# Patient Record
Sex: FEMALE | Race: WHITE | Employment: FULL TIME | URBAN - METROPOLITAN AREA
[De-identification: names, ages, dates, MRNs, and addresses within clinical notes are randomized per-mention and may not be internally consistent; named-entity substitution may affect disease eponyms.]

---

## 2017-07-20 ENCOUNTER — HOSPITAL ENCOUNTER (OUTPATIENT)
Dept: MAMMOGRAPHY | Age: 47
Discharge: HOME | End: 2017-07-20
Attending: FAMILY MEDICINE
Payer: COMMERCIAL

## 2017-07-20 DIAGNOSIS — Z12.31 VISIT FOR SCREENING MAMMOGRAM: ICD-10-CM

## 2017-07-20 PROCEDURE — 77067 SCR MAMMO BI INCL CAD: CPT

## 2018-05-09 ENCOUNTER — HOSPITAL ENCOUNTER (OUTPATIENT)
Dept: MRI IMAGING | Age: 48
Discharge: HOME | End: 2018-05-09
Attending: PHYSICAL MEDICINE & REHABILITATION
Payer: COMMERCIAL

## 2018-05-09 DIAGNOSIS — M47.812 SPONDYLOSIS WITHOUT MYELOPATHY OR RADICULOPATHY, CERVICAL REGION: ICD-10-CM

## 2018-05-09 DIAGNOSIS — M47.812 CERVICAL SPONDYLOSIS WITHOUT MYELOPATHY: ICD-10-CM

## 2018-05-09 PROCEDURE — 72141 MRI NECK SPINE W/O DYE: CPT

## 2018-05-17 ENCOUNTER — HOSPITAL ENCOUNTER (OUTPATIENT)
Dept: MRI IMAGING | Age: 48
Discharge: HOME | End: 2018-05-17
Attending: PHYSICAL MEDICINE & REHABILITATION
Payer: COMMERCIAL

## 2018-05-17 DIAGNOSIS — R93.7 MUSCULOSKELETAL SYSTEM IMAGING ABNORMALITY: ICD-10-CM

## 2018-05-17 PROCEDURE — 74011250636 HC RX REV CODE- 250/636: Performed by: RADIOLOGY

## 2018-05-17 PROCEDURE — A9575 INJ GADOTERATE MEGLUMI 0.1ML: HCPCS | Performed by: RADIOLOGY

## 2018-05-17 PROCEDURE — 70553 MRI BRAIN STEM W/O & W/DYE: CPT

## 2018-05-17 RX ORDER — GADOTERATE MEGLUMINE 376.9 MG/ML
14 INJECTION INTRAVENOUS
Status: COMPLETED | OUTPATIENT
Start: 2018-05-17 | End: 2018-05-17

## 2018-05-17 RX ADMIN — GADOTERATE MEGLUMINE 14 ML: 376.9 INJECTION INTRAVENOUS at 21:33

## 2018-07-16 ENCOUNTER — OFFICE VISIT (OUTPATIENT)
Dept: FAMILY MEDICINE CLINIC | Age: 48
End: 2018-07-16

## 2018-07-16 VITALS
WEIGHT: 156 LBS | OXYGEN SATURATION: 99 % | DIASTOLIC BLOOD PRESSURE: 78 MMHG | BODY MASS INDEX: 29.45 KG/M2 | RESPIRATION RATE: 18 BRPM | HEIGHT: 61 IN | SYSTOLIC BLOOD PRESSURE: 118 MMHG | HEART RATE: 72 BPM | TEMPERATURE: 98.6 F

## 2018-07-16 DIAGNOSIS — C34.91 ADENOCARCINOMA OF RIGHT LUNG (HCC): ICD-10-CM

## 2018-07-16 DIAGNOSIS — Z00.00 ROUTINE GENERAL MEDICAL EXAMINATION AT A HEALTH CARE FACILITY: Primary | ICD-10-CM

## 2018-07-16 RX ORDER — DOCUSATE SODIUM 100 MG/1
100 CAPSULE, LIQUID FILLED ORAL 2 TIMES DAILY
COMMUNITY
End: 2019-08-15

## 2018-07-16 RX ORDER — LISINOPRIL 5 MG/1
TABLET ORAL DAILY
COMMUNITY
End: 2018-07-19 | Stop reason: SDUPTHER

## 2018-07-16 RX ORDER — OXYCODONE AND ACETAMINOPHEN 5; 325 MG/1; MG/1
TABLET ORAL
COMMUNITY
End: 2019-08-15

## 2018-07-16 RX ORDER — LOVASTATIN 20 MG/1
20 TABLET ORAL
COMMUNITY
End: 2019-08-15

## 2018-07-16 RX ORDER — MELATONIN 5 MG
5 CAPSULE ORAL
COMMUNITY
End: 2019-08-15

## 2018-07-16 NOTE — MR AVS SNAPSHOT
[address] [id number] [phone] Patient: Kodi David MRN: [id number] WCS:9/96/8708 Visit Information Date & Time Provider Department Dept. Phone Encounter #  
 7/16/2018 10:00 AM Ana Hector MD 8637 [facility] [phone] [id number] Upcoming Health Maintenance Date Due DTaP/Tdap/Td series (1 - Tdap) 2/14/1989 PAP AKA CERVICAL CYTOLOGY 2/14/1989 FOBT Q 1 YEAR AGE 50-75 2/14/2018 Influenza Age 5 to Adult 8/1/2018 BREAST CANCER SCRN MAMMOGRAM 7/20/2019 Allergies as of 7/16/2018  Review Complete On: 7/16/2018 By: Ana Hector MD  
  
 Severity Noted Reaction Type Reactions Codeine  05/17/2018    Unknown (comments) Sulfur  05/17/2018    Unknown (comments) Current Immunizations  Never Reviewed No immunizations on file. Not reviewed this visit You Were Diagnosed With   
  
 Codes Comments Routine general medical examination at a health care facility    -  Primary ICD-10-CM: Z00.00 ICD-9-CM: V70.0 Ad[facility]     ICD-10-CM: C34.91 
ICD-9-CM: 162.9 Vitals BP Pulse Temp Resp Height(growth percentile) Weight(growth percentile) 118/78 (BP 1 Location: Right arm, BP Patient Position: Sitting) 72 98.6 °F (37 °C) (Oral) 18 5' 1\" (1.549 m) 156 lb (70.8 kg) LMP SpO2 BMI OB Status Smoking Status 06/24/2018 (Exact Date) 99% 29.48 kg/m2 Having regular periods Former Smoker Vitals History BMI and BSA Data Body Mass Index Body Surface Area  
 29.48 kg/m 2 1.75 m 2 Preferred Pharmacy Pharmacy Name Phone [facility] [address] [place] [facility] [phone] Your Updated Medication List  
  
   
This list is accurate as of 7/16/18 11:25 AM.  Always use your most recent med list.  
  
  
  
  
 COLACE 100 mg capsule Generic drug:  docusate sodium Take 100 mg by mouth two (2) times a day. lisinopril 5 mg tablet Commonly known as:  Loni Pinch Take  by mouth daily. lovastatin 20 mg tablet Commonly known as:  MEVACOR Take 20 mg by mouth nightly. melatonin 5 mg Cap capsule Take 5 mg by mouth nightly. oxyCODONE-acetaminophen 5-325 mg per tablet Commonly known as:  PERCOCET Take  by mouth every four (4) hours as needed for Pain. We Performed the Following CBC WITH AUTOMATED DIFF [12300 CPT(R)] HEMOGLOBIN A1C WITH EAG [11550 CPT(R)] LIPID PANEL [78117 CPT(R)] METABOLIC PANEL, COMPREHENSIVE [01685 CPT(R)] TSH 3RD GENERATION [37390 CPT(R)] Introducing [facility]! Dear Noemi Vance: Thank you for requesting a [employer] account. Our records indicate that you already have an active [employer] account. You can access your account anytime at [email or web address] Did you know that you can access your hospital and ER discharge instructions at any time in [employer]? You can also review all of your test results from your hospital stay or ER visit. Additional Information If you have questions, please visit the Frequently Asked Questions section of the [employer] website at [email or web address] Remember, [employer] is NOT to be used for urgent needs. For medical emergencies, dial 911. Now available from your iPhone and Android! Please provide this summary of care documentation to your next provider. Your primary care clinician is listed as Teddy Meier. If you have any questions after today's visit, please call [phone].

## 2018-07-16 NOTE — PROGRESS NOTES
Pt here with mother to establish care. Requesting to have routine fasting lab work. Reports she was recently diagnosed with lung cancer and starts radiation tomorrow. Pt woke up with neck pain, did not get better, went to chiropractor, pain persisted, was referred by PCP to [facility], was evaluated and under their treatment for several months with no improvement. Pt had an MRI of cervical spine, was then referred to Dr. Juan Carlos Dickey, neurosurgeon. Pt went to ER due to pain, had multiple images, was diagnosed with lung cancer (adenocarcinoma) at that time. Pt is about to start on oral therapy, once monthly infusion and radiation. Pt is being treated by Dr. Josiah Green, heme/onc and Dr. Sandie Mon- radiation. Pt is in a chest and neck brace due to multiple fractures. Pt had a recent PET scan. Subjective: (As above and below)     Chief Complaint   Patient presents with   [address]     she is a 46y.o. year old female who presents for evaluation. Reviewed PmHx, RxHx, FmHx, SocHx, AllgHx and updated in chart. Review of Systems - negative except as listed above    Objective:     Vitals:    07/16/18 1031   BP: 118/78   Pulse: 72   Resp: 18   Temp: 98.6 °F (37 °C)   TempSrc: Oral   SpO2: 99%   Weight: 156 lb (70.8 kg)   Height: 5' 1\" (1.549 m)     Physical Examination: General appearance - alert, well appearing, and in no distress  Mental status - normal mood, behavior, speech, dress, motor activity, and thought processes  Mouth - mucous membranes moist, pharynx normal without lesions  Chest - clear to auscultation, no wheezes, rales or rhonchi, symmetric air entry  Heart - normal rate, regular rhythm, normal S1, S2, no murmurs, rubs, clicks or gallops  Musculoskeletal - full neck/chest brace    Assessment/ Plan:   1.  Routine general medical examination at a health care facility  -check fasting labs  - METABOLIC PANEL, COMPREHENSIVE  - CBC WITH AUTOMATED DIFF  - LIPID PANEL  - HEMOGLOBIN A1C WITH EAG  - TSH 3RD GENERATION    2. Adenocarcinoma of right lung [facility])  -request records from all specialists      Follow-up Disposition: As needed  I have discussed the diagnosis with the patient and the intended plan as seen in the above orders. The patient has received an after-visit summary and questions were answered concerning future plans.      Medication Side Effects and Warnings were discussed with patient: yes  Patient Labs were reviewed: yes  Patient Past Records were reviewed:  yes    Jesus Herman M.D.

## 2018-07-17 ENCOUNTER — PATIENT OUTREACH (OUTPATIENT)
Dept: FAMILY MEDICINE CLINIC | Age: 48
End: 2018-07-17

## 2018-07-17 LAB
ALBUMIN SERPL-MCNC: 4.4 G/DL (ref 3.5–5.5)
ALBUMIN/GLOB SERPL: 1.4 {RATIO} (ref 1.2–2.2)
ALP SERPL-CCNC: 164 IU/L (ref 39–117)
ALT SERPL-CCNC: 13 IU/L (ref 0–32)
AST SERPL-CCNC: 16 IU/L (ref 0–40)
BASOPHILS # BLD AUTO: 0 X10E3/UL (ref 0–0.2)
BASOPHILS NFR BLD AUTO: 1 %
BILIRUB SERPL-MCNC: 0.3 MG/DL (ref 0–1.2)
BUN SERPL-MCNC: 11 MG/DL (ref 6–24)
BUN/CREAT SERPL: 14 (ref 9–23)
CALCIUM SERPL-MCNC: 10 MG/DL (ref 8.7–10.2)
CHLORIDE SERPL-SCNC: 102 MMOL/L (ref 96–106)
CHOLEST SERPL-MCNC: 170 MG/DL (ref 100–199)
CO2 SERPL-SCNC: 24 MMOL/L (ref 20–29)
CREAT SERPL-MCNC: 0.76 MG/DL (ref 0.57–1)
EOSINOPHIL # BLD AUTO: 0.1 X10E3/UL (ref 0–0.4)
EOSINOPHIL NFR BLD AUTO: 2 %
ERYTHROCYTE [DISTWIDTH] IN BLOOD BY AUTOMATED COUNT: 13.9 % (ref 12.3–15.4)
EST. AVERAGE GLUCOSE BLD GHB EST-MCNC: 111 MG/DL
GLOBULIN SER CALC-MCNC: 3.1 G/DL (ref 1.5–4.5)
GLUCOSE SERPL-MCNC: 88 MG/DL (ref 65–99)
HBA1C MFR BLD: 5.5 % (ref 4.8–5.6)
HCT VFR BLD AUTO: 37.2 % (ref 34–46.6)
HDLC SERPL-MCNC: 58 MG/DL
HGB BLD-MCNC: 11.8 G/DL (ref 11.1–15.9)
IMM GRANULOCYTES # BLD: 0 X10E3/UL (ref 0–0.1)
IMM GRANULOCYTES NFR BLD: 0 %
INTERPRETATION, 910389: NORMAL
LDLC SERPL CALC-MCNC: 98 MG/DL (ref 0–99)
LYMPHOCYTES # BLD AUTO: 1.5 X10E3/UL (ref 0.7–3.1)
LYMPHOCYTES NFR BLD AUTO: 27 %
MCH RBC QN AUTO: 28.1 PG (ref 26.6–33)
MCHC RBC AUTO-ENTMCNC: 31.7 G/DL (ref 31.5–35.7)
MCV RBC AUTO: 89 FL (ref 79–97)
MONOCYTES # BLD AUTO: 0.5 X10E3/UL (ref 0.1–0.9)
MONOCYTES NFR BLD AUTO: 9 %
NEUTROPHILS # BLD AUTO: 3.4 X10E3/UL (ref 1.4–7)
NEUTROPHILS NFR BLD AUTO: 61 %
PLATELET # BLD AUTO: 267 X10E3/UL (ref 150–379)
POTASSIUM SERPL-SCNC: 4.7 MMOL/L (ref 3.5–5.2)
PROT SERPL-MCNC: 7.5 G/DL (ref 6–8.5)
RBC # BLD AUTO: 4.2 X10E6/UL (ref 3.77–5.28)
SODIUM SERPL-SCNC: 141 MMOL/L (ref 134–144)
TRIGL SERPL-MCNC: 72 MG/DL (ref 0–149)
TSH SERPL DL<=0.005 MIU/L-ACNC: 1.19 UIU/ML (ref 0.45–4.5)
VLDLC SERPL CALC-MCNC: 14 MG/DL (ref 5–40)
WBC # BLD AUTO: 5.6 X10E3/UL (ref 3.4–10.8)

## 2018-07-17 NOTE — PROGRESS NOTES
Nurse Navigator Documentation      Date/Time:  7/17/2018 10:42 AM    [facility] records printed and delivered to Dr. Nweton Chirinos as requested

## 2018-07-17 NOTE — PROGRESS NOTES
Alkaline phosphatase elevated, all other labs are within normal limits. A message has been sent in [employer] and the lab work released to the patient.

## 2018-10-02 ENCOUNTER — OFFICE VISIT (OUTPATIENT)
Dept: FAMILY MEDICINE CLINIC | Age: 48
End: 2018-10-02

## 2018-10-02 VITALS
BODY MASS INDEX: 26.43 KG/M2 | SYSTOLIC BLOOD PRESSURE: 104 MMHG | OXYGEN SATURATION: 99 % | RESPIRATION RATE: 16 BRPM | DIASTOLIC BLOOD PRESSURE: 72 MMHG | HEIGHT: 61 IN | HEART RATE: 107 BPM | WEIGHT: 140 LBS | TEMPERATURE: 98.1 F

## 2018-10-02 DIAGNOSIS — B37.2 YEAST DERMATITIS: Primary | ICD-10-CM

## 2018-10-02 RX ORDER — FLUCONAZOLE 150 MG/1
150 TABLET ORAL DAILY
Qty: 2 TAB | Refills: 0 | Status: SHIPPED | OUTPATIENT
Start: 2018-10-02 | End: 2019-08-15

## 2018-10-02 RX ORDER — NYSTATIN 100000 U/G
CREAM TOPICAL 2 TIMES DAILY
Qty: 15 G | Refills: 0 | Status: SHIPPED | OUTPATIENT
Start: 2018-10-02 | End: 2019-08-15

## 2018-10-02 NOTE — MR AVS SNAPSHOT
[address] [address] 
[phone] Patient: Leyla Jones MRN: [id number] VGD:9/81/4727 Visit Information Date & Time Provider Department Dept. Phone Encounter #  
 10/2/2018  8:20 AM Gonzalez Luis MD [address] [facility] [phone] [id number] Upcoming Health Maintenance Date Due Pneumococcal 19-64 Highest Risk (1 of 3 - PCV13) 2/14/1987 DTaP/Tdap/Td series (1 - Tdap) 2/14/1989 PAP AKA CERVICAL CYTOLOGY 2/14/1989 Shingrix Vaccine Age 50> (1 of 2) 2/14/2018 FOBT Q 1 YEAR AGE 50-75 2/14/2018 Influenza Age 5 to Adult 1/2/2019* BREAST CANCER SCRN MAMMOGRAM 7/20/2019 *Topic was postponed. The date shown is not the original due date. Allergies as of 10/2/2018  Review Complete On: 10/2/2018 By: Gonzalez Luis MD  
  
 Severity Noted Reaction Type Reactions Codeine  05/17/2018    Unknown (comments) Sulfur  05/17/2018    Unknown (comments) Current Immunizations  Never Reviewed No immunizations on file. Not reviewed this visit You Were Diagnosed With   
  
 Codes Comments Yeast dermatitis    -  Primary ICD-10-CM: B37.2 ICD-9-CM: 112.3 Vitals BP Pulse Temp Resp Height(growth percentile) Weight(growth percentile) 104/72 (!) 107 98.1 °F (36.7 °C) (Oral) 16 5' 1\" (1.549 m) 140 lb (63.5 kg) LMP SpO2 BMI OB Status Smoking Status 07/31/2018 99% 26.45 kg/m2 Premenopausal Former Smoker Vitals History BMI and BSA Data Body Mass Index Body Surface Area  
 26.45 kg/m 2 1.65 m 2 Preferred Pharmacy Pharmacy Name Phone [facility] [address] [place] AT [facility] [phone] Your Updated Medication List  
  
   
This list is accurate as of 10/2/18  8:43 AM.  Always use your most recent med list.  
  
  
  
  
 COLACE 100 mg capsule Generic drug:  docusate sodium Take 100 mg by mouth two (2) times a day. fluconazole 150 mg tablet Commonly known as:  DIFLUCAN Take 1 Tab by mouth daily. Repeat in 3 days if needed. lisinopril 5 mg tablet Commonly known as:  Nancylee Foyer Take 1 Tab by mouth daily. lovastatin 20 mg tablet Commonly known as:  MEVACOR Take 20 mg by mouth nightly. melatonin 5 mg Cap capsule Take 5 mg by mouth nightly. nystatin topical cream  
Commonly known as:  MYCOSTATIN Apply  to affected area two (2) times a day. oxyCODONE-acetaminophen 5-325 mg per tablet Commonly known as:  PERCOCET Take  by mouth every four (4) hours as needed for Pain. Prescriptions Sent to Pharmacy Refills  
 fluconazole (DIFLUCAN) 150 mg tablet 0 Sig: Take 1 Tab by mouth daily. Repeat in 3 days if needed. Class: Normal  
 Pharmacy: [address] Ph #: [phone] Route: Oral  
 nystatin (MYCOSTATIN) topical cream 0 Sig: Apply  to affected area two (2) times a day. Class: Normal  
 Pharmacy: [address] Ph #: [phone] Route: Topical  
  
Introducing [facility]! Dear Ashlie Gunter: Thank you for requesting a [employer] account. Our records indicate that you already have an active [employer] account. You can access your account anytime at [email or web address] Did you know that you can access your hospital and ER discharge instructions at any time in [employer]? You can also review all of your test results from your hospital stay or ER visit. Additional Information If you have questions, please visit the Frequently Asked Questions section of the [employer] website at [email or web address] Remember, [employer] is NOT to be used for urgent needs.  For medical emergencies, dial 911. Now available from your iPhone and Android! Please provide this summary of care documentation to your next provider. Your primary care clinician is listed as Teddy Meier. If you have any questions after today's visit, please call [phone].

## 2018-10-02 NOTE — PROGRESS NOTES
1. Have you been to the ER, urgent care clinic since your last visit? Hospitalized since your last visit? Yes, Ltuher Mendoza, 8/12/18    2. Have you seen or consulted any other health care providers outside of the [address] [place] [address] since your last visit? Include any pap smears or colon screening. No     Chief Complaint   Patient presents with    Rash     Panty Line, x1 day     Pt reports that rash is not painful, noted moisture on underwear. Pt is in a c-collar. Subjective: (As above and below)     Chief Complaint   Patient presents with    Rash     Panty Line, x1 day     she is a 46y.o. year old female who presents for evaluation. Reviewed PmHx, RxHx, FmHx, SocHx, AllgHx and updated in chart. Review of Systems - negative except as listed above    Objective:     Vitals:    10/02/18 0817   BP: 104/72   Pulse: (!) 107   Resp: 16   Temp: 98.1 °F (36.7 °C)   TempSrc: Oral   SpO2: 99%   Weight: 140 lb (63.5 kg)   Height: 5' 1\" (1.549 m)     Physical Examination: General appearance - alert, well appearing, and in no distress  Mental status - normal mood, behavior, speech, dress, motor activity, and thought processes  Ears - bilateral TM's and external ear canals normal  Mouth - mucous membranes moist, pharynx normal without lesions  Chest - clear to auscultation, no wheezes, rales or rhonchi, symmetric air entry  Heart - normal rate, regular rhythm, normal S1, S2, no murmurs, rubs, clicks or gallops  Musculoskeletal - c-collar in palce  Skin - erythematous weeping rash under lower abdominal skin fold, +yeast odor    Assessment/ Plan:   1. Yeast dermatitis  -use medications as written  - fluconazole (DIFLUCAN) 150 mg tablet; Take 1 Tab by mouth daily. Repeat in 3 days if needed. Dispense: 2 Tab; Refill: 0  - nystatin (MYCOSTATIN) topical cream; Apply  to affected area two (2) times a day.   Dispense: 15 g; Refill: 0     Follow-up Disposition: As needed  I have discussed the diagnosis with the patient and the intended plan as seen in the above orders. The patient has received an after-visit summary and questions were answered concerning future plans.      Medication Side Effects and Warnings were discussed with patient: yes  Patient Labs were reviewed: yes  Patient Past Records were reviewed:  yes    Quinten Montiel M.D.

## 2019-08-15 ENCOUNTER — OFFICE VISIT (OUTPATIENT)
Dept: FAMILY MEDICINE CLINIC | Age: 49
End: 2019-08-15

## 2019-08-15 VITALS
DIASTOLIC BLOOD PRESSURE: 59 MMHG | HEART RATE: 73 BPM | WEIGHT: 126 LBS | RESPIRATION RATE: 18 BRPM | BODY MASS INDEX: 23.79 KG/M2 | OXYGEN SATURATION: 100 % | SYSTOLIC BLOOD PRESSURE: 92 MMHG | HEIGHT: 61 IN | TEMPERATURE: 98.6 F

## 2019-08-15 DIAGNOSIS — Z12.11 SCREENING FOR MALIGNANT NEOPLASM OF COLON: ICD-10-CM

## 2019-08-15 DIAGNOSIS — Z12.4 SCREENING FOR MALIGNANT NEOPLASM OF CERVIX: ICD-10-CM

## 2019-08-15 DIAGNOSIS — E55.9 HYPOVITAMINOSIS D: ICD-10-CM

## 2019-08-15 DIAGNOSIS — Z13.29 SCREENING FOR THYROID DISORDER: ICD-10-CM

## 2019-08-15 DIAGNOSIS — Z00.00 ENCOUNTER FOR ANNUAL PHYSICAL EXAM: Primary | ICD-10-CM

## 2019-08-15 DIAGNOSIS — C34.91 ADENOCARCINOMA OF RIGHT LUNG (HCC): ICD-10-CM

## 2019-08-15 DIAGNOSIS — N95.9 POSTMENOPAUSAL SYMPTOMS: ICD-10-CM

## 2019-08-15 NOTE — PATIENT INSTRUCTIONS
1. Yams  2. Flax seeds / flax seed oil  3. Pomegranate seeds  4.  Over the counter Borage oil 1000 mg capsule twice a day

## 2019-08-15 NOTE — PROGRESS NOTES
Chief Complaint   Patient presents with    Physical    Labs     Patient in office today for cpe and fasting labs. Pt currently receives gyn care @ [address] [address] with . Pt would like to discuss provider managing gyn care. 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 LMP was in 7/2018, hot flashes and night sweats have been noted. 1. Have you been to the ER, urgent care clinic since your last visit? Hospitalized since your last visit? No    2. Have you seen or consulted any other health care providers outside of the [address] [place] [address] since your last visit? Include any pap smears or colon screening.  No

## 2019-08-15 NOTE — PROGRESS NOTES
Chief Complaint   Patient presents with    Physical    Labs     Patient in office today for cpe and fasting labs. Pt currently receives gyn care @ [facility] with Dr. Yair Patrick. Pt would like to discuss provider managing gyn care. 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 LMP was in 7/2018, hot flashes and night sweats have been noted. Completed radiation and chemo. Currently on a targeted therapy chemo daily. Still receiving one monthly infusion for bone health at [address] [place] [address]. Currently seeing Dr. Chantel Gonzalez at [address] [place] [address] with oncology. Had initial PET scan in June of 2018. Receiving serial scans every 6 months. Last scan was in April showing that nothing has grown or spread. Coping pretty well. Denies any family history of lung cancer. Still working full time at [employer]. Not sleeping well at night. Having severe night sweats. Has tried OTC estrovent without improvement. Last mammogram was 1 month ago. Has not had a pap in some time. History of uterine ablation years ago that caused light flow and would have severe cramping with her cycle. Last pap was approx 2 years ago. Has a remote history abnormal that required cryo. Denies any other concerns at this time. Chief Complaint   Patient presents with   Dell Viramontes     she is a 44y.o. year old female who presents for evalution. Reviewed PmHx, RxHx, FmHx, SocHx, AllgHx and updated and dated in the chart.     Review of Systems - negative except as listed above in the HPI    Objective:     Vitals:    08/15/19 0812   BP: 92/59   Pulse: 73   Resp: 18   Temp: 98.6 °F (37 °C)   TempSrc: Oral   SpO2: 100%   Weight: 126 lb (57.2 kg)   Height: 5' 1\" (1.549 m)     Physical Examination: General appearance - alert, well appearing, and in no distress  Mental status - normal mood, behavior, speech, dress, motor activity, and thought processes  Eyes - pupils equal and reactive, extraocular eye movements intact  Ears - bilateral TM's and external ear canals normal  Nose - normal and patent, no erythema, discharge or polyps and normal nontender sinuses  Mouth - mucous membranes moist, pharynx normal without lesions  Neck - supple, no significant adenopathy, carotids upstroke normal bilaterally, no bruits, thyroid exam: thyroid is normal in size without nodules or tenderness  Chest - clear to auscultation, no wheezes, rales or rhonchi, symmetric air entry  Heart - normal rate, regular rhythm, normal S1, S2, no murmurs  Abdomen - soft, nontender, nondistended, no masses or organomegaly  bowel sounds normal  Extremities - peripheral pulses normal, no ankle edema, no clubbing or cyanosis  Skin - normal coloration and turgor, no rashes, no suspicious skin lesions noted  Pelvic exam: VULVA: normal appearing vulva with no masses, tenderness or lesions, VAGINA: normal appearing vagina with normal color and discharge, no lesions, CERVIX: normal appearing cervix without discharge or lesions. Assessment/ Plan:   Diagnoses and all orders for this visit:    1. Encounter for annual physical exam  -     LIPID PANEL  -     METABOLIC PANEL, COMPREHENSIVE  -     CBC WITH AUTOMATED DIFF  Will notify results and deviate plan based on findings. 2. Adenocarcinoma of right lung ([address]  Continue to follow up with oncology as advised for ongoing management. 3. Postmenopausal symptoms  -     [facility] AND   Will notify results and deviate plan based on findings. Recommended OTC borage oil and increased ingestion of phytoestrogens. Follow up if sx persist or worsen. 4. Hypovitaminosis D  -     VITAMIN D, 25 HYDROXY  Will notify results and deviate plan based on findings.    5. Screening for thyroid disorder  -     TSH 3RD GENERATION  Screening, asx.   6. Screening for malignant neoplasm of colon  -     OCCULT BLOOD IMMUNOASSAY,DIAGNOSTIC  Screening, asx.   7. Screening for malignant neoplasm of cervix  -     PAP IG, RFX APTIMA HPV ASCUS ([id number]))  Will notify results and deviate plan based on findings. Follow-up and Dispositions    · Return if symptoms worsen or fail to improve. I have discussed the diagnosis with the patient and the intended plan as seen in the above orders. The patient has received an after-visit summary and questions were answered concerning future plans. Medication Side Effects and Warnings were discussed with patient: yes  Patient Labs were reviewed and or requested: yes  Patient Past Records were reviewed and or requested  yes  Patient / Caregiver Understanding of treatment plan was verbalized during office visit YES    SABAS Carson    Patient Instructions   1. Yams  2. Flax seeds / flax seed oil  3. Pomegranate seeds  4.  Over the counter Borage oil 1000 mg capsule twice a day

## 2019-08-16 LAB
25(OH)D3+25(OH)D2 SERPL-MCNC: 32.9 NG/ML (ref 30–100)
ALBUMIN SERPL-MCNC: 4.7 G/DL (ref 3.5–5.5)
ALBUMIN/GLOB SERPL: 1.7 {RATIO} (ref 1.2–2.2)
ALP SERPL-CCNC: 74 IU/L (ref 39–117)
ALT SERPL-CCNC: 8 IU/L (ref 0–32)
AST SERPL-CCNC: 12 IU/L (ref 0–40)
BASOPHILS # BLD AUTO: 0 X10E3/UL (ref 0–0.2)
BASOPHILS NFR BLD AUTO: 1 %
BILIRUB SERPL-MCNC: 0.3 MG/DL (ref 0–1.2)
BUN SERPL-MCNC: 15 MG/DL (ref 6–24)
BUN/CREAT SERPL: 11 (ref 9–23)
CALCIUM SERPL-MCNC: 9.5 MG/DL (ref 8.7–10.2)
CHLORIDE SERPL-SCNC: 101 MMOL/L (ref 96–106)
CHOLEST SERPL-MCNC: 235 MG/DL (ref 100–199)
CO2 SERPL-SCNC: 22 MMOL/L (ref 20–29)
CREAT SERPL-MCNC: 1.37 MG/DL (ref 0.57–1)
EOSINOPHIL # BLD AUTO: 0.1 X10E3/UL (ref 0–0.4)
EOSINOPHIL NFR BLD AUTO: 6 %
ERYTHROCYTE [DISTWIDTH] IN BLOOD BY AUTOMATED COUNT: 14.6 % (ref 12.3–15.4)
FSH SERPL-ACNC: 113 MIU/ML
GLOBULIN SER CALC-MCNC: 2.7 G/DL (ref 1.5–4.5)
GLUCOSE SERPL-MCNC: 80 MG/DL (ref 65–99)
HCT VFR BLD AUTO: 34.2 % (ref 34–46.6)
HDLC SERPL-MCNC: 74 MG/DL
HGB BLD-MCNC: 11.3 G/DL (ref 11.1–15.9)
IMM GRANULOCYTES # BLD AUTO: 0 X10E3/UL (ref 0–0.1)
IMM GRANULOCYTES NFR BLD AUTO: 0 %
INTERPRETATION, 910389: NORMAL
INTERPRETATION: NORMAL
LDLC SERPL CALC-MCNC: 145 MG/DL (ref 0–99)
LH SERPL-ACNC: 72 MIU/ML
LYMPHOCYTES # BLD AUTO: 0.5 X10E3/UL (ref 0.7–3.1)
LYMPHOCYTES NFR BLD AUTO: 21 %
MCH RBC QN AUTO: 28.9 PG (ref 26.6–33)
MCHC RBC AUTO-ENTMCNC: 33 G/DL (ref 31.5–35.7)
MCV RBC AUTO: 88 FL (ref 79–97)
MONOCYTES # BLD AUTO: 0.3 X10E3/UL (ref 0.1–0.9)
MONOCYTES NFR BLD AUTO: 13 %
NEUTROPHILS # BLD AUTO: 1.3 X10E3/UL (ref 1.4–7)
NEUTROPHILS NFR BLD AUTO: 59 %
PDF IMAGE, 910387: NORMAL
PLATELET # BLD AUTO: 166 X10E3/UL (ref 150–450)
POTASSIUM SERPL-SCNC: 4.3 MMOL/L (ref 3.5–5.2)
PROT SERPL-MCNC: 7.4 G/DL (ref 6–8.5)
RBC # BLD AUTO: 3.91 X10E6/UL (ref 3.77–5.28)
SODIUM SERPL-SCNC: 139 MMOL/L (ref 134–144)
TRIGL SERPL-MCNC: 82 MG/DL (ref 0–149)
TSH SERPL DL<=0.005 MIU/L-ACNC: 2.14 UIU/ML (ref 0.45–4.5)
VLDLC SERPL CALC-MCNC: 16 MG/DL (ref 5–40)
WBC # BLD AUTO: 2.2 X10E3/UL (ref 3.4–10.8)

## 2019-08-16 NOTE — PROGRESS NOTES
The following message was sent to pt via [employer] portal in reference to lab results:    Good morning Ms. Marcos Malik are the results of your most recent lab work. I have the following recommendations:    1. Your CBC which looks at your white blood cells, red blood cells, and hemoglobin came back looking abnormal due to your white blood cell count being low. I imagine this is due to your chemo. I know you said you are getting serial labs drawn by oncology, has your white blood cell count been this low the last few times? 2. Your metabolic panel which looks at your blood glucose, liver function, and kidney function looks good minus your kidney function being slightly elevated. Also probably a product of your chemo. I recommend you continue to follow up with oncology to monitor this closely with rouine lab work. 3. Your cholesterol is a little elevated. Your LDL or \"bad cholesterol\" is high. Would you be interested in resuming your Mevacor daily to help get this number down? I also urge you to work on making some diet and lifestyle changes to improve this. The BEST way to lower cholesterol is to follow a strict diet that is low fat combined with regular exercise. Here are a few tips on how to do this:  - Avoid foods that are high in saturated fats (especially fried foods)  - Replace butter with margarine  - Eat lots of fresh fruits and vegetables  - Choose fish, chicken, and turkey as your serving of meat  - Try to avoid too many processed foods  - Choose non fat milk  - Use whole wheat bread  You should also try and do 30 minutes of aerobic exercise most days of the week. All of these will contribute to lowering your cholesterol and decrease your risk of heart disease. 4. Your TSH which screens for thyroid disease came back normal. This means you do not have hyper or hypothyroidism. 5. Your vitamin D level is ever so slightly low.  I recommend you take a once daily 2,000 or 5,000 international unit vitamin D supplement daily. You can purchase this over the counter. 6. Your [facility] and LH levels confirm that you are in fact menopausal. Hopefully those recommendations I provided help you. Let me know if not! Please let me know if you have any questions or concerns regarding these results. Let me know what you decide to do about the cholesterol!     Joan Peace, ANTHONYC

## 2019-08-19 LAB
CYTOLOGIST CVX/VAG CYTO: NORMAL
CYTOLOGY CVX/VAG DOC CYTO: NORMAL
CYTOLOGY CVX/VAG DOC THIN PREP: NORMAL
DX ICD CODE: NORMAL
LABCORP, 190119: NORMAL
Lab: NORMAL
OTHER STN SPEC: NORMAL
STAT OF ADQ CVX/VAG CYTO-IMP: NORMAL

## 2019-08-19 NOTE — PROGRESS NOTES
The following message was sent to pt via [employer] portal in reference to lab results:    Good afternoon Ms. Krystina Cabrera are the results of your pap smear. It is perfectly normal showing no atypical cells. I recommend we repeat your pap smear in 3 years. Please let me know if you have any questions or concerns regarding these results.      Teofilo Alvarez, HEIDIP-C

## 2019-08-27 LAB — HEMOCCULT STL QL IA: NEGATIVE

## 2019-08-27 NOTE — PROGRESS NOTES
The following message was sent to pt via [employer] portal in reference to lab results:    Good morning Ms. Lisa De La Cruz,     Your fecal occult blood testing is negative. I recommend we repeat this test in 1 year to screen for colon cancer, unless you decide to have a colonoscopy.      Melita Santos, FNP-C

## 2019-11-22 RX ORDER — LOVASTATIN 20 MG/1
20 TABLET ORAL
Qty: 90 TAB | Refills: 1 | Status: SHIPPED | OUTPATIENT
Start: 2019-11-22 | End: 2020-05-25

## 2020-05-25 RX ORDER — LOVASTATIN 20 MG/1
TABLET ORAL
Qty: 90 TAB | Refills: 1 | Status: SHIPPED | OUTPATIENT
Start: 2020-05-25 | End: 2020-11-30 | Stop reason: SDUPTHER

## 2020-07-24 ENCOUNTER — DOCUMENTATION ONLY (OUTPATIENT)
Dept: FAMILY MEDICINE CLINIC | Age: 50
End: 2020-07-24

## 2020-11-27 ENCOUNTER — TELEPHONE (OUTPATIENT)
Dept: FAMILY MEDICINE CLINIC | Age: 50
End: 2020-11-27

## 2020-11-27 NOTE — TELEPHONE ENCOUNTER
Called and spoke with Tere. Advised to send patient to the ED for further treatment. Tere verbalized understanding.

## 2020-11-27 NOTE — TELEPHONE ENCOUNTER
Received call from [facility] with [facility] cardiovascular dept. Patient was sent to them for a duplex by Dr[facility]co [facility]. Duplex shows a DVT from her femoral vein down to ankle. She tried to call Dr. Bernice Rivera office regarding the results as she can not let the patient leave her office however Dr. Bernice Rivera office is closed today. So since [place] Tang a her PCP she is calling to see if she can get instruction as to whether she should sent the patient down to the ED. She is holding patient while waiting for call back. [facility] can be reached at [phone].

## 2020-11-27 NOTE — TELEPHONE ENCOUNTER
Yes please advise pt to go to ED so that they can initiate treatment and make sure she does not need additional testing like labs and chest CT.

## 2020-11-30 RX ORDER — LOVASTATIN 20 MG/1
TABLET ORAL
Qty: 90 TAB | Refills: 1 | Status: SHIPPED | OUTPATIENT
Start: 2020-11-30

## 2021-01-05 ENCOUNTER — TELEPHONE (OUTPATIENT)
Dept: FAMILY MEDICINE CLINIC | Age: 51
End: 2021-01-05

## 2021-01-05 ENCOUNTER — VIRTUAL VISIT (OUTPATIENT)
Dept: FAMILY MEDICINE CLINIC | Age: 51
End: 2021-01-05
Payer: COMMERCIAL

## 2021-01-05 DIAGNOSIS — R05.3 CHRONIC COUGH: ICD-10-CM

## 2021-01-05 DIAGNOSIS — C34.91 ADENOCARCINOMA OF RIGHT LUNG (HCC): ICD-10-CM

## 2021-01-05 DIAGNOSIS — J18.9 WALKING PNEUMONIA: Primary | ICD-10-CM

## 2021-01-05 PROCEDURE — 99213 OFFICE O/P EST LOW 20 MIN: CPT | Performed by: NURSE PRACTITIONER

## 2021-01-05 RX ORDER — FLUTICASONE PROPIONATE 50 MCG
2 SPRAY, SUSPENSION (ML) NASAL DAILY
Qty: 1 BOTTLE | Refills: 2 | Status: SHIPPED | OUTPATIENT
Start: 2021-01-05

## 2021-01-05 RX ORDER — VENLAFAXINE HYDROCHLORIDE 37.5 MG/1
CAPSULE, EXTENDED RELEASE ORAL
COMMUNITY
Start: 2020-12-04

## 2021-01-05 RX ORDER — DOXYCYCLINE 100 MG/1
100 CAPSULE ORAL 2 TIMES DAILY
Qty: 20 CAP | Refills: 0 | Status: SHIPPED | OUTPATIENT
Start: 2021-01-05 | End: 2021-01-15

## 2021-01-05 NOTE — PROGRESS NOTES
Marino Pisano is a 44 y.o. female who was seen by synchronous (real-time) audio-video technology on 1/5/2021 for Cough        Assessment & Plan:   Diagnoses and all orders for this visit:    1. Walking pneumonia / 2. Chronic cough  -     doxycycline (MONODOX) 100 mg capsule; Take 1 Cap by mouth two (2) times a day for 10 days. -     fluticasone propionate (FLONASE) 50 mcg/actuation nasal spray; 2 Sprays by Both Nostrils route daily. Pt is scheduled to have CT tomorrow. In the meantime recommended she start and complete full course of doxy. Start flonase before bedtime as sx could be due to a sinusitis/PND. Dwp ADRs/SEs of medication. Push fluids. Continue use of humidifier. Follow up if sx persist or worsen. 3. Adenocarcinoma of right lung [facility])  Adjusting her treatment plan based on CT results tomorrow. I spent at least 15 minutes on this visit with this established patient. [id number]  Subjective:     Chief Complaint   Patient presents with    Cough     Patient vv appt today for cough that began in November. Pt states she started taking zyrtec daily-which did help with cough. Cough is nonproductive,dry. In the morning can note slight pressure in head and ears,but would improve throughout the day. Pt does note in the past couple days cough has started to become productive,secretions are milky. Pt does have lung cancer-pt has a CT scan scheduled for tomorrow. Pt was hospitalized in November for multiple PEs. Currently on eliquis. Had to stop tagrisso due to cancer not responding. Tried a new med which she could not tolerate. Plan is to deviate plan based on CT results tomorrow. Gets infusion every 6 weeks. Denies any hemoptysis. Denies any sick contacts. Denies any fever. Denies any aches and pain. Denies any SOB and dyspnea. Received flu shot in October.  is only home on the weekend, works in [employer]. Denies any associated heartburn or reflux. Denies any wheezing.    Denies any recent abx. Denies feeling any PND. Denies any ST. Feels most congested first thing in the morning when she wakes up and improves as the day goes by. Has a humidifer in her bedroom. Working from home and going to get one in the room she works in. Denies any other concerns at this time. Has VV with oncology on 1/15 to discuss CT results. Prior to Admission medications    Medication Sig Start Date End Date Taking? Authorizing Provider   apixaban (ELIQUIS) 5 mg tablet 10 mg. 11/28/20  Yes Provider, Historical   venlafaxine-SR (EFFEXOR-XR) 37.5 mg capsule TK 1 C PO QD 12/4/20  Yes Provider, Historical   lovastatin (MEVACOR) 20 mg tablet TAKE 1 TABLET BY MOUTH EVERY NIGHT 11/30/20  Yes Tobi Atkins, JADIEL   zoledronic acid (ZOMETA IV) by IntraVENous route. Yes Provider, Historical   osimertinib (TAGRISSO) 80 mg tablet Take  by mouth daily. Provider, Historical     Patient Active Problem List    Diagnosis Date Noted    Adenocarcinoma of right lung ([facility] [address] 07/16/2018     Current Outpatient Medications   Medication Sig Dispense Refill    apixaban (ELIQUIS) 5 mg tablet 10 mg.      venlafaxine-SR (EFFEXOR-XR) 37.5 mg capsule TK 1 C PO QD      doxycycline (MONODOX) 100 mg capsule Take 1 Cap by mouth two (2) times a day for 10 days. 20 Cap 0    fluticasone propionate (FLONASE) 50 mcg/actuation nasal spray 2 Sprays by Both Nostrils route daily. 1 Bottle 2    lovastatin (MEVACOR) 20 mg tablet TAKE 1 TABLET BY MOUTH EVERY NIGHT 90 Tab 1    zoledronic acid (ZOMETA IV) by IntraVENous route.  osimertinib (TAGRISSO) 80 mg tablet Take  by mouth daily. Allergies   Allergen Reactions    Codeine Unknown (comments)    Sulfur Unknown (comments)       ROS    Objective:   No flowsheet data found.    General: alert, cooperative, no distress   Mental  status: normal mood, behavior, speech, dress, motor activity, and thought processes, able to follow commands   HENT: NCAT   Neck: no visualized mass   Resp: no respiratory distress; frequent dry sounding cough during VV   Neuro: no gross deficits             Additional exam findings: We discussed the expected course, resolution and complications of the diagnosis(es) in detail. Medication risks, benefits, costs, interactions, and alternatives were discussed as indicated. I advised her to contact the office if her condition worsens, changes or fails to improve as anticipated. She expressed understanding with the diagnosis(es) and plan. Saint Comment, who was evaluated through a patient-initiated, synchronous (real-time) audio-video encounter, and/or her healthcare decision maker, is aware that it is a billable service, with coverage as determined by her insurance carrier. She provided verbal consent to proceed: Yes, and patient identification was verified. It was conducted pursuant to the emergency declaration under the [address] [place] [address], [address] [place] [address] authority and the [employer] Act. A caregiver was present when appropriate. Ability to conduct physical exam was limited. I was at home. The patient was at home.       Cheryl Guerrero NP

## 2021-01-05 NOTE — PROGRESS NOTES
Chief Complaint   Patient presents with    Cough     Patient vv appt today for cough that began in November. Pt states she started taking zyrtec daily-which did help with cough. Cough is nonproductive,dry. In the morning can note slight pressure in head and ears,but would improve throughout the day. Pt does note in the past couple days cough has started to become productive,secretions are milky. Pt does have lung cancer-pt has a CT scan scheduled for tomorrow.

## 2021-01-05 NOTE — TELEPHONE ENCOUNTER
Spoke with pt,have advised that covid testing is not available this week due to staffing. Pt understands and is not looking for covid testing.